# Patient Record
Sex: MALE | Race: WHITE
[De-identification: names, ages, dates, MRNs, and addresses within clinical notes are randomized per-mention and may not be internally consistent; named-entity substitution may affect disease eponyms.]

---

## 2017-09-26 ENCOUNTER — HOSPITAL ENCOUNTER (EMERGENCY)
Dept: HOSPITAL 39 - ER | Age: 82
LOS: 1 days | Discharge: HOME | End: 2017-09-27
Payer: MEDICARE

## 2017-09-26 VITALS — OXYGEN SATURATION: 100 %

## 2017-09-26 DIAGNOSIS — R10.13: Primary | ICD-10-CM

## 2017-09-26 DIAGNOSIS — J44.9: ICD-10-CM

## 2017-09-26 DIAGNOSIS — Z86.73: ICD-10-CM

## 2017-09-26 RX ADMIN — LIDOCAINE HYDROCHLORIDE ONE ML: 20 SOLUTION ORAL; TOPICAL at 23:57

## 2017-09-26 NOTE — ED.PDOC
History of Present Illness





- General


Chief Complaint: Abdominal Pain


Stated Complaint: abdominal pain


Time Seen by Provider: 09/26/17 23:39


Information Source: patient


Exam Limitations: no limitations





- History of Present Illness


Initial Comments: 





ABD PAIN X 2 HRS.  ONSET - IT AWOKE HIM FROM SLEEP.  CONSTANT.  IMPROVING.  NO 

NVDC.  NO CP/SOB.  


BM YEST NL. 


NO SIMILAR PREVIOUS.  H/O SBO2 YRS AGO AND PT STATES "THAT WAS 10 TIMES WORSE."


HAD LAP APPY IN PAST.  


Abdominal Pain Onset Location: epigastric


Pain Radiation: no radiation


Quality: mild, sharpness


Timing/Duration: 1-3 hours


Improving Factors: nothing


Worsening Factors: nothing


Associated Symptoms: denies symptoms





Review of Systems





- Review of Systems


Constitutional: Denies: chills, diaphoresis, fever


EENTM: States: no symptoms reported


Respiratory: States: no symptoms reported


Cardiology: Denies: chest pain, palpitations


Gastrointestinal/Abdominal: States: abdominal pain.  Denies: constipation, 

diarrhea, nausea, vomiting


Genitourinary: States: no symptoms reported.  Denies: dysuria, frequency, pain


Musculoskeletal: States: no symptoms reported


Skin: States: no symptoms reported


Neurological: States: no symptoms reported


Endocrine: States: no symptoms reported


Hematologic/Lymphatic: States: no symptoms reported


All other Systems: Reviewed and Negative





Past Medical History (General)





- Patient Medical History


Hx Stroke: Yes


Hx of COPD: Yes


Hx Congestive Heart Failure: No


Hx Hypertension: Yes


Hx Diabetes: No


Hx Gastroesophageal Reflux: No - hiatal hernia


Hx MRSA: No





- Vaccination History


Hx Influenza Vaccination: No


Hx Pneumococcal Vaccination: Yes





- Social History


Hx Tobacco Use: Yes


Cigarettes Packs Per Day: 1


Hx Alcohol Use: No





Family Medical History





- Family History


  ** Father


Family History: Unknown


Living Status: Unknown





Physical Exam





- Physical Exam


General Appearance: Comfortable, No apparent distress, Well Nourished


Eyes, Ears, Nose, Throat Exam: PERRL/EOMI, normal ENT inspection, TMs normal


Neck: full range of motion, supple


Respiratory: chest non-tender, lungs clear


Cardiovascular/Chest: normal peripheral pulses, regular rate, rhythm


Peripheral Pulses: No deficit


Gastrointestinal/Abdominal: normal bowel sounds, soft, no organomegaly, no 

pulsatile mass, other - EPIGASTRIC JUST BARELY TO DEEP PALPATION BUT NOT TO 

LIGHT PALPATION.  NO GUARDING OR REBOUND.  NO TYMPANI.  NO FLUID WAVE.  


Back Exam: no CVA tenderness, no vertebral tenderness


Extremity: non-tender, normal inspection


Neurologic: no motor/sensory deficits, alert, normal mood/affect


Skin Exam: normal color, warm/dry


Lymphatic: no adenopathy


Special Observations: No evidence of discomfort





Progress





- Progress


Progress: 





09/27/17 00:00


CLINICALLY C/W GERD/DYSPEPSIA - OCCURRED WHILE SUPIN (IN BED); IS IMPROVING NOW 

THAT IS SITTING UPRIGHT IN ER; SOLITARY SYMPTOM (NO NVDC); ABD EXAM IS BENIGN 

AND IS NOT AN ACUTE SURGICAL ABDOMEN; VS WNL (NO HYPOTENSION OR TACHYCARDIA).  


GI COCKTAIL AND ZANTAC GIVEN. 





09/27/17 00:21


PT STATES THE PAIN IMPROVED ALL OF A SUDDEN.  SAFE FOR DC TO HOME W/ F/U.  





Departure





- Departure


Clinical Impression: 


 Dyspepsia





Disposition: Discharge to Home or Self Care


Condition: Good


Departure Forms:  ED Discharge - Pt. Copy, Patient Portal Self Enrollment


Instructions:  DI for Dyspepsia


Diet: regular diet


Activity: increase activity as tolerated


Referrals: 


Xin Andujar NP [Primary Care Provider] - 1 Week


Prescriptions: 


raNITIdine HCL [Zantac] 150 mg PO BID 15 Days


Home Medications: 


Ambulatory Orders





Albuterol Sulfate [Proair Hfa] 2 puff INH Q4H PRN 09/30/16 


Finasteride [Proscar] 5 mg PO DAILY 09/30/16 


Lisinopril 20 mg PO DAILY 09/30/16 


raNITIdine HCL [Zantac] 150 mg PO BID 15 Days 09/27/17 








Additional Instructions: 


The symptoms you are having and consistent with gastroesophageal reflux, where 

the stomach acid causes pains in the upper abdomen.  The medicine I prescribed 

helps but only works if you take it consistently twice per day.  Please take 

the medicine twice per day for 1 week and follow-up with your nurse 

practitioner to inform her if it is working or not.  From there, he or she may 

alter the treatment or run more tests.  If it worsens before then, please see 

her sooner or return to the ER.

## 2017-09-27 VITALS — SYSTOLIC BLOOD PRESSURE: 155 MMHG | TEMPERATURE: 98.6 F | DIASTOLIC BLOOD PRESSURE: 86 MMHG

## 2018-05-04 ENCOUNTER — HOSPITAL ENCOUNTER (OUTPATIENT)
Dept: HOSPITAL 39 - YCFC.O | Age: 83
End: 2018-05-04
Attending: NURSE PRACTITIONER
Payer: MEDICARE

## 2018-05-04 DIAGNOSIS — R10.9: Primary | ICD-10-CM

## 2018-05-04 NOTE — RAD
EXAM DESCRIPTION: KUB



CLINICAL HISTORY: LEFT FLANK PN



COMPARISON: None Available.



TECHNIQUE: KUB



FINDINGS: 

There is an unremarkable bowel gas pattern. No small bowel

dilatation to suggest obstruction. Dextroscoliotic curvature of

the lumbar spine is seen with multilevel spurring.

There is no mass or calculus observed. Left kidney silhouette is

obscured by overlying fecal material and bowel gas.



IMPRESSION: 

No acute process.



Electronically signed by:  Rylan Moore MD  5/4/2018 5:11 PM

CDT Workstation: [id number]

## 2018-05-18 ENCOUNTER — HOSPITAL ENCOUNTER (OUTPATIENT)
Dept: HOSPITAL 39 - YCFC.O | Age: 83
End: 2018-05-18
Attending: NURSE PRACTITIONER
Payer: MEDICARE

## 2018-05-18 DIAGNOSIS — M25.552: Primary | ICD-10-CM

## 2018-05-19 NOTE — RAD
EXAM DESCRIPTION: Hip,Left 2 Views



CLINICAL HISTORY: 82 years, Male, LEFT HIP PAIN.  M25.552



COMPARISON: None



TECHNIQUE: AP and frog leg lateral views of the hip



FINDINGS: 



2 views of the left hip reveal no fracture or dislocation.



Moderate spurring of the lateral acetabular margin. Convex

superolateral left femoral head neck junction could indicate

presence of impingement. Mild spurring of the inferior acetabular

rim. Degenerative changes are seen in the lower L-spine. Sacrum

and left hemipelvis appear intact. No proximal femoral fracture

or lytic lesion.



IMPRESSION:



Negative for fracture or dislocation.



Electronically signed by:  Rylan Moore MD  5/19/2018 8:45

AM CDT Workstation: [id number]

## 2019-08-05 ENCOUNTER — HOSPITAL ENCOUNTER (EMERGENCY)
Dept: HOSPITAL 39 - ER | Age: 84
Discharge: NURSING, REHAB OR LONG-TERM CARE | End: 2019-08-05
Payer: MEDICARE

## 2019-08-05 VITALS — SYSTOLIC BLOOD PRESSURE: 141 MMHG | DIASTOLIC BLOOD PRESSURE: 76 MMHG

## 2019-08-05 VITALS — OXYGEN SATURATION: 93 %

## 2019-08-05 VITALS — TEMPERATURE: 97.3 F

## 2019-08-05 DIAGNOSIS — J44.9: ICD-10-CM

## 2019-08-05 DIAGNOSIS — Z87.891: ICD-10-CM

## 2019-08-05 DIAGNOSIS — I10: ICD-10-CM

## 2019-08-05 DIAGNOSIS — R25.1: Primary | ICD-10-CM

## 2019-08-05 DIAGNOSIS — Z79.899: ICD-10-CM

## 2019-08-05 DIAGNOSIS — Z86.73: ICD-10-CM

## 2019-08-05 NOTE — CT
EXAM DESCRIPTION: Head



CLINICAL HISTORY: 83 years Male, tremors



COMPARISON: CT head 1/17/2010.



TECHNIQUE:  Axial images obtained from the skull base to the

vertex without intravenous contrast with images.  Coronal and

sagittal reformations provided.  This exam was performed

according to our departmental dose-optimization program, which

includes automated exposure control, adjustment of the mA and/or

kV according to patient size and/or use of iterative

reconstruction technique.



Time Last Seen Well (If known) for Code Stroke:  n/a



FINDINGS:

Brain Parenchyma, ventricles, meninges, and extra-axial spaces:

Mild generalized cerebral and loss. Nonspecific white matter

hypodensities in the cerebral hemispheres likely related to

ischemic small vessel disease. Possible difficulty

differentiating a small acute infarction given these

hypodensities.  Stable 8 mm hypodensity in the right lentiform

nucleus. New 5 mm hypodensity in the right caudate head.  No

acute intracranial hemorrhage.  No abnormal extra-axial fluid

collection.



Vascular: Atherosclerosis is within the carotid siphons.



Calvarium, paranasal sinuses, mastoids, and orbits: Calvarium

intact. Visualized paranasal sinuses and mastoid air cells clear.

Orbits unremarkable.



IMPRESSION: 

1. No acute intracranial abnormality.

2. Stable chronic lacunar infarction in the right lentiform

nucleus.

3. New but chronic appearing lacunar infarction in the right

caudate head.

4. Senescent changes.



Electronically signed by:  Tomi Mccabe MD  8/5/2019 9:23 AM CDT

Workstation: [id number]

## 2019-08-05 NOTE — RAD
EXAM DESCRIPTION:

Chest,1 View



CLINICAL HISTORY:

83 years Male, tremors



COMPARISON:

October 18, 2016



TECHNIQUE:

AP portable chest.



FINDINGS:

Fair expansion of the lungs is evident without consolidation,

layering effusion, or large mass.

Heart size and vascularity appear normal for AP technique and

degree of inspiration.

No gross bony, hilar, or mediastinal abnormalities are noted. 



IMPRESSION:

Normal chest, one view



Electronically signed by:  Shayne Alanis MD  8/5/2019 8:40 AM

CDT Workstation: [id number]

## 2019-08-05 NOTE — ED.PDOC
History of Present Illness





- General


Chief Complaint: Neuro Symptoms/Deficits


Time Seen by Provider: 08/05/19 08:22


Source: patient


Exam Limitations: no limitations





- History of Present Illness


Initial Comments: 





Patient presents from [facility] with tremors.  The staff reported to EMS that 

he was difficult to arouse.  He had paroxysmal tremors in all extremities at 

that point. The patient reports them same. He says that he does not believe the 

tremors are due to being too cold. He has a history of CVA.  No other 

complaints. Blood sugar 124 by EMS.


Timing/Duration: unsure


Severity: mild


Improving Factors: nothing


Worsening Factors: nothing


Associated Symptoms: denies symptoms


Allergies/Adverse Reactions: 


Allergies





NO KNOWN ALLERGY Allergy (Verified 10/18/16 13:50)


   





Home Medications: 


Ambulatory Orders





Albuterol Sulfate [Proair Hfa] 2 puff INH Q4H PRN 09/30/16 


Finasteride [Proscar] 5 mg PO DAILY 09/30/16 


Lisinopril 20 mg PO DAILY 09/30/16 


raNITIdine HCL [Zantac] 150 mg PO BID 15 Days  tab 09/27/17 











Review of Systems





- Review of Systems


Constitutional: States: no symptoms reported


EENTM: States: no symptoms reported


Respiratory: States: no symptoms reported


Cardiology: States: no symptoms reported


Gastrointestinal/Abdominal: States: no symptoms reported


Genitourinary: States: no symptoms reported


Musculoskeletal: States: no symptoms reported


Skin: States: no symptoms reported


Neurological: States: see HPI


Endocrine: States: no symptoms reported


Hematologic/Lymphatic: States: no symptoms reported





Past Medical History (General)





- Patient Medical History


Hx Stroke: Yes


Hx of COPD: Yes


Hx Congestive Heart Failure: No


Hx Hypertension: Yes


Hx Diabetes: No


Hx Gastroesophageal Reflux: No - hiatal hernia


Hx MRSA: No





- Vaccination History


Hx Influenza Vaccination: No


Hx Pneumococcal Vaccination: Yes





- Social History


Hx Tobacco Use: Yes


Hx Alcohol Use: No





Family Medical History





- Family History


  ** Father


Family History: Unknown


Living Status: Unknown





Physical Exam





- Physical Exam


General Appearance: Alert


Eye Exam: bilateral normal


Ears, Nose, Throat: normal ENT inspection


Neck: non-tender, full range of motion, supple


Respiratory: lungs clear, normal breath sounds


Cardiovascular/Chest: normal peripheral pulses, regular rate, rhythm, no edema


Gastrointestinal/Abdominal: normal bowel sounds, non tender, soft


Back Exam: normal inspection, no CVA tenderness


Extremity: normal range of motion, non-tender, normal inspection


Neurologic: CNs II-XII nml as tested, alert, normal mood/affect, oriented x 3, 

other - paroxysmal tremors in the extremities. No loss of consciousness during 

these tremors that only last a second or less.  Patient is aware of them. 


Skin Exam: normal color


Lymphatic: no adenopathy





Progress





- Progress


Progress: 





08/05/19 10:12


                                Laboratory Tests











  08/05/19 08/05/19 08/05/19





  08:40 08:40 08:40


 


WBC  6.1  


 


RBC  4.19 L  


 


Hgb  13.0 L  


 


Hct  38.4 L  


 


MCV  91.7  


 


MCH  31.1 H  


 


MCHC  34.0  


 


RDW  14.5  


 


Plt Count  220  


 


MPV  9.3  


 


Absolute Neuts (auto)  4.20  


 


Absolute Lymphs (auto)  1.10  


 


Absolute Monos (auto)  0.60  


 


Absolute Eos (auto)  0.10  


 


Absolute Basos (auto)  0.10  


 


Neutrophils %  68.9  


 


Lymphocytes %  18.8 L  


 


Monocytes %  9.3 H  


 


Eosinophils %  2.1  


 


Basophils %  0.9  


 


PT   9.9 


 


INR   0.99 


 


PTT (SP)   24.2 


 


Sodium    139


 


Potassium    4.4


 


Chloride    107


 


Carbon Dioxide    24


 


Anion Gap    12.4


 


BUN    21 H


 


Creatinine    1.36 H


 


BUN/Creatinine Ratio    15.4


 


Random Glucose    119 H


 


Serum Osmolality    281.7


 


Calcium    9.0


 


Magnesium   


 


Total Bilirubin    0.6


 


AST    15


 


ALT    10


 


Alkaline Phosphatase    106


 


Creatine Kinase   


 


CK-MB (CK-2)   


 


CK-MB (CK-2) %   


 


Troponin I   


 


Serum Total Protein    6.8


 


Albumin    3.8


 


Globulin    3.0


 


Albumin/Globulin Ratio    1.3














  08/05/19





  08:40


 


WBC 


 


RBC 


 


Hgb 


 


Hct 


 


MCV 


 


MCH 


 


MCHC 


 


RDW 


 


Plt Count 


 


MPV 


 


Absolute Neuts (auto) 


 


Absolute Lymphs (auto) 


 


Absolute Monos (auto) 


 


Absolute Eos (auto) 


 


Absolute Basos (auto) 


 


Neutrophils % 


 


Lymphocytes % 


 


Monocytes % 


 


Eosinophils % 


 


Basophils % 


 


PT 


 


INR 


 


PTT (SP) 


 


Sodium 


 


Potassium 


 


Chloride 


 


Carbon Dioxide 


 


Anion Gap 


 


BUN 


 


Creatinine 


 


BUN/Creatinine Ratio 


 


Random Glucose 


 


Serum Osmolality 


 


Calcium 


 


Magnesium  2.3


 


Total Bilirubin 


 


AST 


 


ALT 


 


Alkaline Phosphatase 


 


Creatine Kinase  65


 


CK-MB (CK-2)  2.2


 


CK-MB (CK-2) %  Not Reportable


 


Troponin I  < 0.02


 


Serum Total Protein 


 


Albumin 


 


Globulin 


 


Albumin/Globulin Ratio 








CT head showed no acute disease. There was a chronic lacunar infarct that was 

not on his previous CT head. Patient's symptoms resolved in the E.D.  His son 

came and said that the patient had been anxious about the loss of his phone two 

days ago.  He said that the patient was talking and acting at his baseline.  No 

laboratory explanation for the resolved symptoms. Care instructions given. E.R. 

warnings given. Questions were elicited and answered. Patient voiced 

understanding and agreement with the plan.





Departure





- Departure


Clinical Impression: 


 Occasional tremors





Disposition: Discharge to SNF


Condition: Good


Departure Forms:  ED Discharge - Pt. Copy, Patient Portal Self Enrollment


Diet: other - as per your regular doctor


Activity: increase activity as tolerated


Referrals: 


RUPA NAJERA [Primary Care Provider] - 1-2 Weeks


Home Medications: 


Ambulatory Orders





Albuterol Sulfate [Proair Hfa] 2 puff INH Q4H PRN 09/30/16 


Finasteride [Proscar] 5 mg PO DAILY 09/30/16 


Lisinopril 20 mg PO DAILY 09/30/16 


raNITIdine HCL [Zantac] 150 mg PO BID 15 Days  tab 09/27/17 








Additional Instructions: 


Return to the E.R .for worsening symptoms.

## 2019-12-04 ENCOUNTER — HOSPITAL ENCOUNTER (OUTPATIENT)
Dept: HOSPITAL 39 - GOCC | Age: 84
End: 2019-12-04
Attending: INTERNAL MEDICINE
Payer: MEDICARE

## 2019-12-04 DIAGNOSIS — R30.0: ICD-10-CM

## 2019-12-04 DIAGNOSIS — N17.9: ICD-10-CM

## 2019-12-04 DIAGNOSIS — N40.0: Primary | ICD-10-CM

## 2020-01-03 ENCOUNTER — HOSPITAL ENCOUNTER (EMERGENCY)
Dept: HOSPITAL 39 - ER | Age: 85
Discharge: ANOTHER HOSPITAL | End: 2020-01-03
Payer: MEDICARE

## 2020-01-03 VITALS — DIASTOLIC BLOOD PRESSURE: 74 MMHG | SYSTOLIC BLOOD PRESSURE: 125 MMHG | OXYGEN SATURATION: 95 %

## 2020-01-03 VITALS — TEMPERATURE: 97.9 F
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
## 2020-01-03 DIAGNOSIS — S51.002A: ICD-10-CM

## 2020-01-03 DIAGNOSIS — I10: ICD-10-CM

## 2020-01-03 DIAGNOSIS — M79.641: ICD-10-CM

## 2020-01-03 DIAGNOSIS — R47.81: ICD-10-CM

## 2020-01-03 DIAGNOSIS — I63.9: Primary | ICD-10-CM

## 2020-01-03 DIAGNOSIS — Z79.899: ICD-10-CM
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
## 2020-01-03 DIAGNOSIS — R51: ICD-10-CM

## 2020-01-03 DIAGNOSIS — F03.90: ICD-10-CM

## 2020-01-03 DIAGNOSIS — M54.2: ICD-10-CM

## 2020-01-03 NOTE — RAD
EXAM DESCRIPTION: Elbow,Left 2 Views



CLINICAL HISTORY: 84 years Male, fall with left elbow pain



COMPARISON: None.



FINDINGS: The visualized bones appear mildly osteopenic. No acute

fracture or dislocation. The soft tissues appear normal.. Mild

degenerative changes are identified.



IMPRESSION:

Mild osteoarthritis of the left elbow. No acute bony abnormality.



Electronically signed by:  Clinton Holder MD  1/3/2020 4:11

PM [facility] Workstation: [id number]

## 2020-01-03 NOTE — RAD
EXAM DESCRIPTION: Hand,Right 3 Views



CLINICAL HISTORY: fall with pain of right hand



COMPARISON: None Available.



TECHNIQUE: AP, LATERAL, AND OBLIQUE 



FINDINGS:

The visualized bones appear poorly mineralized. No acute fracture

or dislocation. Moderate osteoarthritis of the first

carpometacarpal joint.

The soft tissues appear grossly unremarkable.



IMPRESSION: No acute traumatic abnormality of the right hand.



Electronically signed by:  Clinton Holder MD  1/3/2020 4:12

PM [facility] Workstation: [id number]

## 2020-01-03 NOTE — ED.PDOC
History of Present Illness





- General


Chief Complaint: Trauma


Stated Complaint: Fall with AMS


Time Seen by Provider: 01/03/20 15:03


Source: patient, RN notes reviewed, Vital Signs reviewed, family - Son


Exam Limitations: other - Patient with dementia and therefore difficult to 

obtain a good history and review of systems.





- History of Present Illness


Initial Comments: 





Patient is an 84-year-old white male who presents from the nursing home with 

complaints of left elbow pain, right hand pain and some indication of head and 

neck pain status post fall last night from his wheelchair and from his bed this 

morning.  There was no witnesses to these falls and patient is unsure if he lost

consciousness.  The pain is mild in nature.  It is throbbing.  Palpation makes 

the hand and elbow pain worse.  Nothing makes it better.


Timing/Duration: 24 hours


Severity: mild


Improving Factors: nothing


Worsening Factors: movement, other - Palpation


Associated Symptoms: denies symptoms


Allergies/Adverse Reactions: 


Allergies





NO KNOWN ALLERGY Allergy (Verified 01/03/20 15:32)


   





Home Medications: 


Ambulatory Orders





Albuterol Sulfate [Proair Hfa] 2 puff INH Q4H PRN 09/30/16 


Finasteride [Proscar] 5 mg PO DAILY 09/30/16 


Lisinopril 20 mg PO DAILY 09/30/16 


raNITIdine HCL [Zantac] 150 mg PO BID 15 Days  tab 09/27/17 











Review of Systems





- Review of Systems


Review of Systems: 





01/03/20 15:55


Review of systems is limited secondary to patient dementia.  All history and 

physical limited history of present illness and review of systems secondary to 

dementia from the patient.  All information was obtained from the son.


Constitutional: States: no symptoms reported


EENTM: States: no symptoms reported


Respiratory: States: no symptoms reported


Cardiology: States: no symptoms reported


Gastrointestinal/Abdominal: States: no symptoms reported


Genitourinary: States: no symptoms reported


Musculoskeletal: States: joint pain - Left elbow and right hand, neck pain.  

Denies: back pain


Skin: States: other - Skin tear on left elbow


Neurological: States: headache, other - History of dementia


Endocrine: States: no symptoms reported


Hematologic/Lymphatic: States: no symptoms reported


Unable to Obtain Due To: dementia - Review of systems is limited secondary to 

dementia.  I was able to obtain some from the patient as well as from the son.





Past Medical History (General)





- Patient Medical History


Hx Stroke: No


Hx of COPD: No


Hx Cardiac Disorders: No


Hx Congestive Heart Failure: No


Hx Hypertension: Yes


Hx Diabetes: No


Hx Gastroesophageal Reflux: No - hiatal hernia


Hx Cancer: No


Hx MRSA: No





- Vaccination History


Hx Influenza Vaccination: No


Hx Pneumococcal Vaccination: Yes





- Social History


Hx Tobacco Use: No


Hx Alcohol Use: No


Hx Substance Use: No


Hx Substance Use Treatment: No


Hx Depression: No





- Activities of Daily Living


Nursing Home/Assisted Living (if applicable):: [facility]





- Female History


Patient is a Female of Child Bearing Age (10 -59 yrs old): No


Patient Pregnant: No





Family Medical History





- Family History


  ** Father


Family History: Unknown


Living Status: Unknown





Physical Exam





- Physical Exam


General Appearance: Alert, Comfortable, Well Developed, Well Groomed, Well 

Hydrated, Well Nourished


Eye Exam: bilateral normal, bilateral abnormal EOM


Ears, Nose, Throat: normal ENT inspection, normal pharynx, other - Patient is 

hard of hearing


Neck: full range of motion, supple, normal inspection


Respiratory: chest non-tender, lungs clear, normal breath sounds, no respiratory

 distress, no accessory muscle use


Cardiovascular/Chest: normal peripheral pulses, regular rate, rhythm, no edema, 

no gallop, no JVD, no murmur


Peripheral Pulses: radial,right: 2+, radial,left: 2+


Gastrointestinal/Abdominal: normal bowel sounds, non tender, soft, no 

organomegaly, no pulsatile mass


Back Exam: normal inspection, no CVA tenderness, no vertebral tenderness


Extremity: pelvis stable, swelling - Left elbow with skin tear and mild 

tenderness to palpation.  Tenderness to palpation of the right hand.  No 

crepitus felt.


Neurologic: CNs II-XII nml as tested, no motor/sensory deficits, alert, normal 

mood/affect, other - Patient is oriented to self only.


Skin Exam: normal color, other - Skin tear left elbow


Lymphatic: no adenopathy





Progress





- Progress


Progress: 


Differential diagnosis: UTI, dementia, CVA, medication reaction among others.











01/03/20 18:33


In further discussions with the son, he relates that on arrival to the nursing 

home today around 2 pm, his father had a left facial droop and left hand 

weakness, but these symptoms resolved prior to arrival here.  This was not 

divulged during the initial history and physical.  Of concern that the patient 

may have had an acute stroke.  I suspect that it happened earlier in the day 

today.  But it is unclear as the CT scan is negative and the patient's last 

known normal was yesterday afternoon.  Plan on transfer the patient to [facility] in [place] for further evaluation of this potential stroke.  I

 have discussed this with  who accepts the patient for transfer.  I 

have discussed this with the son he voices understanding and agreement of the 

plan of care.








01/03/20 18:37


Nasim Caraballo M.D.


#[phone]





- Results/Orders


Results/Orders: 





EXAM DESCRIPTION: CT head without contrast.  





CLINICAL HISTORY: Fall/trauma with confusion .  COMPARISON: August 5, 2019   

TECHNIQUE: Contiguous axial sections are obtained as per protocol. Sagittal and 

coronal reformations are submitted Automatic exposure control (AEC), mA and/or 

kV adjustment by patient size, and/or iterative reconstructive technique was 

used, per departmental dose optimization program, during the performance of the 

CT examination.  





FINDINGS: Evidence of lacunar infarct is seen in the right head of the caudate 

nucleus and right external capsule. Periventricular deep white matter 

demyelinating changes are noted along with mild cerebral atrophy. Ventricles, 

sulci and cisterns appear normal. Normal gray-white matter differentiation is 

noted. No evidence of intra or extra-axial hemorrhage, hematoma, mass, mass 

effect or midline shift is noted. The posterior fossa structures appear normal. 

The bony calvarium appears intact. The soft tissues of the scalp appear 

unremarkable.  Normal appearance of the orbits are noted. The paranasal sinuses 

and mastoids appear normal.   





IMPRESSION: No acute findings or interval change.    





Electronically signed by: Hemalatha Hernández MD 1/3/2020 4:00 PM CST











EXAM DESCRIPTION: Cervical Spine  





CLINICAL HISTORY: fall/trauma with confusion  COMPARISON: The cervical spine 

dated December 4, 2008.  TECHNIQUE: Contiguous 2 mm axial images were obtained 

from the skull base to the thoracic inlet level without the use of intravenous 

contrast. Reformatted coronal and sagittal images were also obtained and 

reviewed. This exam was performed according to our departmental dose-

optimization program, which includes automated exposure control, adjustment of 

the mA and/or kV according to patient size and/or use of iterative 

reconstruction technique.  





FINDINGS: The craniocervical junction is intact. The odontoid process is in good

 alignment with the lateral masses of C2. The normal lordotic curvature is well 

preserved. The vertebral body heights are well-maintained with no acute 

compression deformity. Intervertebral disc space narrowing is noted at the C4-C5

 level. Multilevel degenerative disease are noted more predominant at C3-C4 and 

C6-C7 level. Prominent anterior disc osteophyte complex is noted at C2-C3 and 

C3-C4 levels. Prominent posterior disc osteophyte complex is noted at C5-C6 and 

C6-C7 levels. The posterior disc osteophyte complex at C6-C7 level indenting the

 thecal sac. No evidence of subluxation or spondylolisthesis.  The visualized 

prevertebral and paravertebral soft tissues appear normal.  





IMPRESSION: 1. No acute fracture or traumatic malalignment. 2. Multilevel 

degenerative changes more predominant at C3-C4 and C6-C7 levels. A prominent 

posterior disc osteophyte at C6-C7 level is indenting the thecal sac.  





Electronically signed by: Clinton Holder MD 1/3/2020 4:08














EXAM DESCRIPTION: Hand,Right 3 Views  CLINICAL HISTORY: fall with pain of right 

hand  





COMPARISON: None Available.  TECHNIQUE: AP, LATERAL, AND OBLIQUE  





FINDINGS: The visualized bones appear poorly mineralized. No acute fracture or 

dislocation. 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 The 

soft tissues appear grossly unremarkable.  





IMPRESSION: No acute traumatic abnormality of the right hand.  





Electronically signed by: Clinton Holder MD 1/3/2020 4:12











EXAM DESCRIPTION: Elbow,Left 2 Views  





CLINICAL HISTORY: 84 years Male, fall with left elbow pain  COMPARISON: None.  





FINDINGS: The visualized bones appear mildly osteopenic. No acute fracture or 

dislocation. The soft tissues appear normal.. Mild degenerative changes are 

identified.  





IMPRESSION: Mild osteoarthritis of the left elbow. No acute bony abnormality.  





Electronically signed by: Clinton Holder MD 1/3/2020 4:11





                         Laboratory Results - last 24 hr











  01/03/20 01/03/20 01/03/20





  16:00 16:00 16:00


 


WBC  6.7  


 


RBC  4.12 L  


 


Hgb  12.8 L  


 


Hct  38.4 L  


 


MCV  93.3  


 


MCH  31.2 H  


 


MCHC  33.4  


 


RDW  14.5  


 


Plt Count  185  


 


MPV  9.5  


 


Absolute Neuts (auto)  5.00  


 


Absolute Lymphs (auto)  0.80 L  


 


Absolute Monos (auto)  0.80  


 


Absolute Eos (auto)  0.10  


 


Absolute Basos (auto)  0.00  


 


Neutrophils %  75.1  


 


Lymphocytes %  11.7 L  


 


Monocytes %  11.4 H  


 


Eosinophils %  1.3  


 


Basophils %  0.5  


 


Sodium   137 


 


Potassium   4.4 


 


Chloride   103 


 


Carbon Dioxide   25 


 


Anion Gap   13.4 


 


BUN   23 H 


 


Creatinine   1.44 H 


 


BUN/Creatinine Ratio   16.0 


 


Random Glucose   104 


 


Serum Osmolality   277.8 


 


Calcium   8.8 


 


Total Bilirubin   0.7 


 


AST   15 


 


ALT   12 


 


Alkaline Phosphatase   73 


 


Troponin I    0.02


 


Serum Total Protein   6.6 


 


Albumin   3.8 


 


Globulin   2.8 


 


Albumin/Globulin Ratio   1.4 


 


Lipase   32 


 


Urine Color   


 


Urine Appearance   


 


Urine pH   


 


Ur Specific Gravity   


 


Urine Protein   


 


Urine Glucose (UA)   


 


Urine Ketones   


 


Urine Blood   


 


Urine Nitrite   


 


Urine Bilirubin   


 


Urine Urobilinogen   


 


Ur Leukocyte Esterase   


 


Urine RBC   


 


Urine WBC   


 


Ur Epithelial Cells   


 


Urine Bacteria   














  01/03/20





  16:44


 


WBC 


 


RBC 


 


Hgb 


 


Hct 


 


MCV 


 


MCH 


 


MCHC 


 


RDW 


 


Plt Count 


 


MPV 


 


Absolute Neuts (auto) 


 


Absolute Lymphs (auto) 


 


Absolute Monos (auto) 


 


Absolute Eos (auto) 


 


Absolute Basos (auto) 


 


Neutrophils % 


 


Lymphocytes % 


 


Monocytes % 


 


Eosinophils % 


 


Basophils % 


 


Sodium 


 


Potassium 


 


Chloride 


 


Carbon Dioxide 


 


Anion Gap 


 


BUN 


 


Creatinine 


 


BUN/Creatinine Ratio 


 


Random Glucose 


 


Serum Osmolality 


 


Calcium 


 


Total Bilirubin 


 


AST 


 


ALT 


 


Alkaline Phosphatase 


 


Troponin I 


 


Serum Total Protein 


 


Albumin 


 


Globulin 


 


Albumin/Globulin Ratio 


 


Lipase 


 


Urine Color  Yellow


 


Urine Appearance  Clear


 


Urine pH  5.5


 


Ur Specific Gravity  1.020


 


Urine Protein  Negative


 


Urine Glucose (UA)  Negative


 


Urine Ketones  Negative


 


Urine Blood  Negative


 


Urine Nitrite  Negative


 


Urine Bilirubin  Negative


 


Urine Urobilinogen  0.2


 


Ur Leukocyte Esterase  Negative


 


Urine RBC  0


 


Urine WBC  0


 


Ur Epithelial Cells  0


 


Urine Bacteria  0














- EKG/XRAY/CT


CT Ordered: Yes





Departure





- Departure


Clinical Impression: 


 Slurred speech





CVA (cerebral vascular accident)


Qualifiers:


 CVA mechanism: unspecified Qualified Code(s): I63.9 - Cerebral infarction, 

unspecified





Time of Disposition: 17:45


Disposition: Transfer to Hospital


Condition: Fair


Referrals: 


RUPA NAJERA [Primary Care Provider] - 1-2 Weeks


Home Medications: 


Ambulatory Orders





Albuterol Sulfate [Proair Hfa] 2 puff INH Q4H PRN 09/30/16 


Finasteride [Proscar] 5 mg PO DAILY 09/30/16 


Lisinopril 20 mg PO DAILY 09/30/16 


raNITIdine HCL [Zantac] 150 mg PO BID 15 Days  tab 09/27/17 











Critical Care Note





- Critical Care Note


Total Time (mins): 35





Transfer to Outside Facility





- Transfer Information


Decision to Transfer Date: 01/03/20


Decision to Transfer Time: 17:45


Reason for Transfer: required specialist not available


Accepting Facility: [facility]

## 2020-01-03 NOTE — CT
EXAM DESCRIPTION: Cervical Spine



CLINICAL HISTORY: fall/trauma with confusion



COMPARISON: The cervical spine dated December 4, 2008.



TECHNIQUE: Contiguous 2 mm axial images were obtained from the

skull base to the thoracic inlet level without the use of

intravenous contrast. Reformatted coronal and sagittal images

were also obtained and reviewed.

This exam was performed according to our departmental

dose-optimization program, which includes automated exposure

control, adjustment of the mA and/or kV according to patient size

and/or use of iterative reconstruction technique.



FINDINGS: 

The craniocervical junction is intact. The odontoid process is in

good alignment with the lateral masses of C2. The normal lordotic

curvature is well preserved. The vertebral body heights are

well-maintained with no acute compression deformity.

Intervertebral disc space narrowing is noted at the C4-C5 level.

Multilevel degenerative disease are noted more predominant at

C3-C4 and C6-C7 level. Prominent anterior disc osteophyte complex

is noted at C2-C3 and C3-C4 levels. Prominent posterior disc

osteophyte complex is noted at C5-C6 and C6-C7 levels. The

posterior disc osteophyte complex at C6-C7 level indenting the

thecal sac. No evidence of subluxation or spondylolisthesis.



The visualized prevertebral and paravertebral soft tissues appear

normal.



IMPRESSION: 

1. No acute fracture or traumatic malalignment.

2. Multilevel degenerative changes more predominant at C3-C4 and

C6-C7 levels. A prominent posterior disc osteophyte at C6-C7

level is indenting the thecal sac.



Electronically signed by:  Clinton Holder MD  1/3/2020 4:08

PM [facility] Workstation: [id number]

## 2020-01-03 NOTE — CT
EXAM DESCRIPTION: 

CT head without contrast.



CLINICAL HISTORY: Fall/trauma with confusion .



COMPARISON: August 5, 2019





TECHNIQUE:

Contiguous axial sections are obtained as per protocol. Sagittal

and coronal reformations are submitted

Automatic exposure control (AEC), mA and/or kV adjustment by

patient size, and/or iterative reconstructive technique was used,

per departmental dose optimization program, during the

performance of the CT examination.



FINDINGS: Evidence of lacunar infarct is seen in the right head

of the caudate nucleus and right external capsule.

Periventricular deep white matter demyelinating changes are noted

along with mild cerebral atrophy.

Ventricles, sulci and cisterns appear normal.  Normal gray-white

matter differentiation is noted. No evidence of intra or

extra-axial hemorrhage, hematoma, mass, mass effect or midline

shift is noted. 

The posterior fossa structures appear normal. The bony calvarium

appears intact. The soft tissues of the scalp appear

unremarkable.



Normal appearance of the orbits are noted. The paranasal sinuses

and mastoids appear normal. 





IMPRESSION: 

No acute findings or interval change.







Electronically signed by:  Hemalatha Hernández MD  1/3/2020 4:00 PM [facility]

Workstation: [id number]